# Patient Record
(demographics unavailable — no encounter records)

---

## 2024-10-31 NOTE — ASSESSMENT
[FreeTextEntry1] :  Patient is a 60F with right breast IDC (+/+/-).  She underwent bilateral scg mmg and subsequent right mmg/us in 10/2024 which showed right RA 5mm mass biopsied as IDC (+/+/-).  I had a lengthy discussion with this patient regarding diagnostic results, impressions, recommendations, risks and benefits. I have explained to the patient that the needle biopsy yielded a diagnosis of invasive breast cancer.  We reviewed the treatment of breast cancer, including surgery, radiation, chemotherapy, and anti-estrogen treatment.  Surgical options were reviewed, including a wide excision to negative margins with SLNB and subsequent whole breast radiation versus a mastectomy with or without reconstruction. She understood that one could still have a recurrence after a mastectomy. Patient understands that her overall survival is equivalent whether she undergoes a partial mastectomy with adjuvant radiotherapy or whether she undergoes a mastectomy, as evidenced by the NSABP B-06 trial. I have explained to her that while survival rates are the same between these two options, breast conservation therapy is associated with a slightly higher risk of local recurrence.  Axillary staging was discussed. We reviewed the sentinel lymph node procedure and associated risks of lymphedema, numbness, range of motion deficit and damage to surrounding structures.   The general indications for the use of adjuvant hormonal therapy, chemotherapy and targeted therapies were discussed. It was explained that medical treatments are dependent on the pathology results including the receptor status. We reviewed that her cancer is estrogen positive, and that may necessitate anti-estrogen therapy for 5-10 years. We discussed that a genomic assay, Oncotype Dx, might be sent on her surgical specimen and this will determine her need for adjuvant chemotherapy which is usually sequenced prior to adjuvant radiation therapy. She will be referred to medical oncology for an interpretation of her results and further treatment after the surgery.  The indications for radiation therapy and common side effects were discussed. The patient will meet with a radiation oncologist if indicated. Radiation is usually needed after breast conservation. In addition, even with a mastectomy, radiation might be needed under certain circumstances such as close margins and positive nodes. We discussed that radiotherapy is usually given Monday through Friday for 3-5 weeks, but could be longer or shorter.   The genetics of breast cancer were discussed.  Patient wishes to proceed with breast conservation with sentinel node biopsy. We reviewed that the risks of the operation include, but are not limited to damage to surrounding structures, infection, bleeding, cosmetic deformity, sensory changes, need for re-excision, and anesthetic related complications and an up to 8% risk of upper extremity lymphedema with a sentinel node biopsy as quoted by the NSABP trials. She understands that with lumpectomy, if margins are positive, additional surgery would be required.  Bio-impedance testing performed.  All questions and concerns were answered in detail.  Patient is for right breast needle localized lumpectomy and axillary sentinel lymph node biopsy. Sozo performed.  Total time spent on encounter was greater than 60 minutes , which included face to face time with the patient, performing an exam, reviewing previous medical records, reviewing current imaging/ pathology, documenting in patient record and coordinating care/imaging. Greater than 50% of the encounter was spent on counseling and coordination of her breast issue.

## 2024-10-31 NOTE — PHYSICAL EXAM
[Normocephalic] : normocephalic [EOMI] : extra ocular movement intact [No Supraclavicular Adenopathy] : no supraclavicular adenopathy [No Cervical Adenopathy] : no cervical adenopathy [Examined in the supine and seated position] : examined in the supine and seated position [No dominant masses] : no dominant masses in right breast  [No dominant masses] : no dominant masses left breast [No Nipple Retraction] : no left nipple retraction [No Nipple Discharge] : no left nipple discharge [No Axillary Lymphadenopathy] : no left axillary lymphadenopathy [No Rashes] : no rashes [No Ulceration] : no ulceration [de-identified] : NL respirations [de-identified] : Post biopsy ecchymosis noted

## 2024-10-31 NOTE — HISTORY OF PRESENT ILLNESS
[FreeTextEntry1] : ULZ PANDYA is a 60-year-old female patient who presents today for initial consultation for newly diagnosed right breast cancer.  FHx: Denies  Her work-up is as follows: B/L Screening Mammo - 09/06/2024: -Breast density: There are scattered areas of fibroglandular density. -No suspicious masses, calcifications, or other findings are seen in the left breast.  -In the right breast at 6:00 location there is a new mass for which additional imaging is recommended. BI-RADS 0: INCOMPLETE - NEED ADDITIONAL IMAGING EVALUATION  Right Uni Dx Mammo & Sono - 10/09/2024: -In the retroareolar right breast there is an irregular mass which persists on additional imaging.  US BREAST LIMITED RIGHT -In the retroareolar right corresponding to the mammographic abnormality there is a hypoechoic irregular mass measuring 0.5 x 0.4 x 0.5 cm.  Ultrasound core biopsy is recommended for further evaluation. BI-RADS 4: SUSPICIOUS  US Guided Core Bx - 10/24/2024: Right, retroareolar, 0.5cm: (cork) -Invasive ductal carcinoma, moderately differentiated. The pathology results are concordant with the imaging findings. ER  (+) [place]  (+) HER2  (-) Ki-67  15%  Denies any current complaints. No acute changes to her breasts.

## 2024-10-31 NOTE — DATA REVIEWED
[FreeTextEntry1] : B/L Screening Mammo - 09/06/2024: Tomosynthesis 3D and 2D imaging of the breast(s) were performed.  Current study was also evaluated with a computer aided detection (CAD) system.   Breast density: There are scattered areas of fibroglandular density.   No suspicious masses, calcifications, or other findings are seen in the left breast.  In the right breast at 6:00 location there is a new mass for which additional imaging is recommended. IMPRESSION:    Indeterminant mass in the right breast. Additional spot compression views, lateral view and ultrasound is recommended.   An additional imaging exam of the right breast(s) is recommended.   The patient will be sent a letter to return for additional imaging.   BI-RADS 0: INCOMPLETE - NEED ADDITIONAL IMAGING EVALUATION   Right Uni Dx Mammo & Sono - 10/09/2024: MAMMOGRAM:    Tomosynthesis 3D and 2D imaging of the breast(s) were performed.  Current study was also evaluated with a computer aided detection (CAD) system.   Breast Density: There are scattered areas of fibroglandular density.   In the retroareolar right breast there is an irregular mass which persists on additional imaging.    US BREAST LIMITED RIGHT   Color flow, Gray scale and real-time ultrasound of the right breast was performed and targeted to the area(s) of interest.   In the retroareolar right corresponding to the mammographic abnormality there is a hypoechoic irregular mass measuring 0.5 x 0.4 x 0.5 cm. Ultrasound core biopsy is recommended for further evaluation. OVERALL IMPRESSION:    Indeterminant hypoechoic mass in the retroareolar right breast corresponding to the mammographic abnormality for which ultrasound core biopsy is recommended   Biopsy of the right breast(s) is recommended.   A letter will be sent to the patient to return for a biopsy.   If management other than recommended is necessary, the referrer is advised to reach out to the biopsy coordinator at [phone] to ensure appropriate scheduling and documentation.   BI-RADS 4: SUSPICIOUS   US Guided Core Bx - 10/24/2024: Right, retroareolar, 0.5cm: (cork) -Invasive ductal carcinoma, moderately differentiated. The pathology results are concordant with the imaging findings. ER  (+) CA  (+) HER2  (-) Ki-67  15%

## 2024-12-15 NOTE — ASSESSMENT
[FreeTextEntry1] : 59 yo female has stage IA ([id number]) IDC of the right breast, G2, ER/UT positive and Her-2 negative, s/p right breast lumpectomy and SLNB with negative margins.   Assessment and Plan: We had a detailed discussion regarding to her diagnosis, stage, prognosis and adjuvant systemic therapy. The pathology report was reviewed. Janet has stage IA IDC of the right breast, ER/UT positive and Her-2 negative with favorable pathologic features (small size, G2, negative SLN, -LVI). Her risk of distant recurrence would be low. Oncotype RS is pending. She is recommended for adjuvant endocrine therapy.  We reviewed benefit and side effects of adjuvant endocrine therapy with AI. Anastrozole 1 mg daily for 5 years is recommended. The potential side effects may include but not limit to muscular and skeletal symptoms, arthralgia, bone loss, osteopenia and osteoporosis, a small increased risk of cardiovascular events and slight increase of hyperlipidemia.  We discussed bone health management. She is recommended to take calcium and vitamin d supplement, and regular physical activities. She will be referred for bone density. We will make additional recommendation based on her bone density.  She will see Dr. Min to discuss adjuvant breast radiotherapy. She will start Anastrozole after completion of radiotherapy. A script was sent to her pharmacy.   All questions were answered appropriately. She agrees with the plan.  RTO for followup in 3 months. She will call if there is nay concern.

## 2024-12-15 NOTE — CONSULT LETTER
[Dear  ___] : Dear  [unfilled], [Consult Letter:] : I had the pleasure of evaluating your patient, [unfilled]. [Please see my note below.] : Please see my note below. [Consult Closing:] : Thank you very much for allowing me to participate in the care of this patient.  If you have any questions, please do not hesitate to contact me. [Sincerely,] : Sincerely, [DrIngrid  ___] : Dr. WILSON [DrIngrid ___] : Dr. WILSON [FreeTextEntry3] : Megan Casas MD

## 2024-12-15 NOTE — HISTORY OF PRESENT ILLNESS
[de-identified] : 60-year-old female patient is referred by Dr Cruz for consultation of adjuvant systemic therapy for right breast cancer.  Her work-up is as follows: B/L Screening Mammo - 09/06/2024: -Breast density: There are scattered areas of fibroglandular density. -No suspicious masses, calcifications, or other findings are seen in the left breast. -In the right breast at 6:00 location there is a new mass for which additional imaging is recommended. BI-RADS 0: INCOMPLETE - NEED ADDITIONAL IMAGING EVALUATION  Right Uni Dx Mammo & Sono - 10/09/2024: -In the retroareolar right breast there is an irregular mass which persists on additional imaging. US BREAST LIMITED RIGHT -In the retroareolar right corresponding to the mammographic abnormality there is a hypoechoic irregular mass measuring 0.5 x 0.4 x 0.5 cm. Ultrasound core biopsy is recommended for further evaluation. BI-RADS 4: SUSPICIOUS  US Guided Core Bx - 10/24/2024: Right, retroareolar, 0.5cm: (cork) -Invasive ductal carcinoma, moderately differentiated, ER pos %, [place] pos 51-60%, Her-2 neg (1+ by IHC), Ki-67 15%.  On 11/27/2024Breast, she had right retroareolar mass, needle localized lumpectomy: - Prior biopsy site changes with invasive moderately differentiated ductal carcinoma, 9.5 mm (microscopic measurement), with scattered single cell necrosis and focal solid papillary features. - Non-extensive ductal carcinoma in situ (DCIS), cribriform and comedo types, intermediate nuclear grade. - Margins are negative.  - No lymphovascular invasion is identified. - Atrophic fatty breast tissue with proliferative type fibrocystic changes associated with phosphate microcalcifications present in small ductules. - Two lymph nodes (0/2). Negative for carcinoma. - AJCC 8th Edition Pathologic Stage: pT1b, p(sn)N0, pMx.  She recovered well from surgery and is here today to discuss adjuvant systemic therapy.

## 2024-12-15 NOTE — PHYSICAL EXAM
[Fully active, able to carry on all pre-disease performance without restriction] : Status 0 - Fully active, able to carry on all pre-disease performance without restriction [Normal] : grossly intact [de-identified] : S/P right breast lumpectomy and right axillary SLNB. Surgical incisions are healing well.

## 2024-12-15 NOTE — PHYSICAL EXAM
[Fully active, able to carry on all pre-disease performance without restriction] : Status 0 - Fully active, able to carry on all pre-disease performance without restriction [Normal] : grossly intact [de-identified] : S/P right breast lumpectomy and right axillary SLNB. Surgical incisions are healing well.

## 2024-12-15 NOTE — ASSESSMENT
[FreeTextEntry1] : 61 yo female has stage IA ([id number]) IDC of the right breast, G2, ER/KS positive and Her-2 negative, s/p right breast lumpectomy and SLNB with negative margins.   Assessment and Plan: We had a detailed discussion regarding to her diagnosis, stage, prognosis and adjuvant systemic therapy. The pathology report was reviewed. Janet has stage IA IDC of the right breast, ER/KS positive and Her-2 negative with favorable pathologic features (small size, G2, negative SLN, -LVI). Her risk of distant recurrence would be low. Oncotype RS is pending. She is recommended for adjuvant endocrine therapy.  We reviewed benefit and side effects of adjuvant endocrine therapy with AI. Anastrozole 1 mg daily for 5 years is recommended. The potential side effects may include but not limit to muscular and skeletal symptoms, arthralgia, bone loss, osteopenia and osteoporosis, a small increased risk of cardiovascular events and slight increase of hyperlipidemia.  We discussed bone health management. She is recommended to take calcium and vitamin d supplement, and regular physical activities. She will be referred for bone density. We will make additional recommendation based on her bone density.  She will see Dr. Min to discuss adjuvant breast radiotherapy. She will start Anastrozole after completion of radiotherapy. A script was sent to her pharmacy.   All questions were answered appropriately. She agrees with the plan.  RTO for followup in 3 months. She will call if there is nay concern.

## 2024-12-15 NOTE — HISTORY OF PRESENT ILLNESS
[de-identified] : 60-year-old female patient is referred by Dr Cruz for consultation of adjuvant systemic therapy for right breast cancer.  Her work-up is as follows: B/L Screening Mammo - 09/06/2024: -Breast density: There are scattered areas of fibroglandular density. -No suspicious masses, calcifications, or other findings are seen in the left breast. -In the right breast at 6:00 location there is a new mass for which additional imaging is recommended. BI-RADS 0: INCOMPLETE - NEED ADDITIONAL IMAGING EVALUATION  Right Uni Dx Mammo & Sono - 10/09/2024: -In the retroareolar right breast there is an irregular mass which persists on additional imaging. US BREAST LIMITED RIGHT -In the retroareolar right corresponding to the mammographic abnormality there is a hypoechoic irregular mass measuring 0.5 x 0.4 x 0.5 cm. Ultrasound core biopsy is recommended for further evaluation. BI-RADS 4: SUSPICIOUS  US Guided Core Bx - 10/24/2024: Right, retroareolar, 0.5cm: (cork) -Invasive ductal carcinoma, moderately differentiated, ER pos %, [place] pos 51-60%, Her-2 neg (1+ by IHC), Ki-67 15%.  On 11/27/2024Breast, she had right retroareolar mass, needle localized lumpectomy: - Prior biopsy site changes with invasive moderately differentiated ductal carcinoma, 9.5 mm (microscopic measurement), with scattered single cell necrosis and focal solid papillary features. - Non-extensive ductal carcinoma in situ (DCIS), cribriform and comedo types, intermediate nuclear grade. - Margins are negative.  - No lymphovascular invasion is identified. - Atrophic fatty breast tissue with proliferative type fibrocystic changes associated with phosphate microcalcifications present in small ductules. - Two lymph nodes (0/2). Negative for carcinoma. - AJCC 8th Edition Pathologic Stage: pT1b, p(sn)N0, pMx.  She recovered well from surgery and is here today to discuss adjuvant systemic therapy.

## 2024-12-15 NOTE — HISTORY OF PRESENT ILLNESS
[de-identified] : 60-year-old female patient is referred by Dr Cruz for consultation of adjuvant systemic therapy for right breast cancer.  Her work-up is as follows: B/L Screening Mammo - 09/06/2024: -Breast density: There are scattered areas of fibroglandular density. -No suspicious masses, calcifications, or other findings are seen in the left breast. -In the right breast at 6:00 location there is a new mass for which additional imaging is recommended. BI-RADS 0: INCOMPLETE - NEED ADDITIONAL IMAGING EVALUATION  Right Uni Dx Mammo & Sono - 10/09/2024: -In the retroareolar right breast there is an irregular mass which persists on additional imaging. US BREAST LIMITED RIGHT -In the retroareolar right corresponding to the mammographic abnormality there is a hypoechoic irregular mass measuring 0.5 x 0.4 x 0.5 cm. Ultrasound core biopsy is recommended for further evaluation. BI-RADS 4: SUSPICIOUS  US Guided Core Bx - 10/24/2024: Right, retroareolar, 0.5cm: (cork) -Invasive ductal carcinoma, moderately differentiated, ER pos %, [place] pos 51-60%, Her-2 neg (1+ by IHC), Ki-67 15%.  On 11/27/2024Breast, she had right retroareolar mass, needle localized lumpectomy: - Prior biopsy site changes with invasive moderately differentiated ductal carcinoma, 9.5 mm (microscopic measurement), with scattered single cell necrosis and focal solid papillary features. - Non-extensive ductal carcinoma in situ (DCIS), cribriform and comedo types, intermediate nuclear grade. - Margins are negative.  - No lymphovascular invasion is identified. - Atrophic fatty breast tissue with proliferative type fibrocystic changes associated with phosphate microcalcifications present in small ductules. - Two lymph nodes (0/2). Negative for carcinoma. - AJCC 8th Edition Pathologic Stage: pT1b, p(sn)N0, pMx.  She recovered well from surgery and is here today to discuss adjuvant systemic therapy.

## 2024-12-15 NOTE — ASSESSMENT
[FreeTextEntry1] : 59 yo female has stage IA ([id number]) IDC of the right breast, G2, ER/KS positive and Her-2 negative, s/p right breast lumpectomy and SLNB with negative margins.   Assessment and Plan: We had a detailed discussion regarding to her diagnosis, stage, prognosis and adjuvant systemic therapy. The pathology report was reviewed. Janet has stage IA IDC of the right breast, ER/KS positive and Her-2 negative with favorable pathologic features (small size, G2, negative SLN, -LVI). Her risk of distant recurrence would be low. Oncotype RS is pending. She is recommended for adjuvant endocrine therapy.  We reviewed benefit and side effects of adjuvant endocrine therapy with AI. Anastrozole 1 mg daily for 5 years is recommended. The potential side effects may include but not limit to muscular and skeletal symptoms, arthralgia, bone loss, osteopenia and osteoporosis, a small increased risk of cardiovascular events and slight increase of hyperlipidemia.  We discussed bone health management. She is recommended to take calcium and vitamin d supplement, and regular physical activities. She will be referred for bone density. We will make additional recommendation based on her bone density.  She will see Dr. Min to discuss adjuvant breast radiotherapy. She will start Anastrozole after completion of radiotherapy. A script was sent to her pharmacy.   All questions were answered appropriately. She agrees with the plan.  RTO for followup in 3 months. She will call if there is nay concern.

## 2024-12-15 NOTE — HISTORY OF PRESENT ILLNESS
[de-identified] : 60-year-old female patient is referred by Dr Cruz for consultation of adjuvant systemic therapy for right breast cancer.  Her work-up is as follows: B/L Screening Mammo - 09/06/2024: -Breast density: There are scattered areas of fibroglandular density. -No suspicious masses, calcifications, or other findings are seen in the left breast. -In the right breast at 6:00 location there is a new mass for which additional imaging is recommended. BI-RADS 0: INCOMPLETE - NEED ADDITIONAL IMAGING EVALUATION  Right Uni Dx Mammo & Sono - 10/09/2024: -In the retroareolar right breast there is an irregular mass which persists on additional imaging. US BREAST LIMITED RIGHT -In the retroareolar right corresponding to the mammographic abnormality there is a hypoechoic irregular mass measuring 0.5 x 0.4 x 0.5 cm. Ultrasound core biopsy is recommended for further evaluation. BI-RADS 4: SUSPICIOUS  US Guided Core Bx - 10/24/2024: Right, retroareolar, 0.5cm: (cork) -Invasive ductal carcinoma, moderately differentiated, ER pos %, [place] pos 51-60%, Her-2 neg (1+ by IHC), Ki-67 15%.  On 11/27/2024Breast, she had right retroareolar mass, needle localized lumpectomy: - Prior biopsy site changes with invasive moderately differentiated ductal carcinoma, 9.5 mm (microscopic measurement), with scattered single cell necrosis and focal solid papillary features. - Non-extensive ductal carcinoma in situ (DCIS), cribriform and comedo types, intermediate nuclear grade. - Margins are negative.  - No lymphovascular invasion is identified. - Atrophic fatty breast tissue with proliferative type fibrocystic changes associated with phosphate microcalcifications present in small ductules. - Two lymph nodes (0/2). Negative for carcinoma. - AJCC 8th Edition Pathologic Stage: pT1b, p(sn)N0, pMx.  She recovered well from surgery and is here today to discuss adjuvant systemic therapy.

## 2024-12-15 NOTE — PHYSICAL EXAM
[Fully active, able to carry on all pre-disease performance without restriction] : Status 0 - Fully active, able to carry on all pre-disease performance without restriction [Normal] : grossly intact [de-identified] : S/P right breast lumpectomy and right axillary SLNB. Surgical incisions are healing well.

## 2024-12-15 NOTE — PHYSICAL EXAM
[Fully active, able to carry on all pre-disease performance without restriction] : Status 0 - Fully active, able to carry on all pre-disease performance without restriction [Normal] : grossly intact [de-identified] : S/P right breast lumpectomy and right axillary SLNB. Surgical incisions are healing well.

## 2024-12-15 NOTE — ASSESSMENT
[FreeTextEntry1] : 61 yo female has stage IA ([id number]) IDC of the right breast, G2, ER/SC positive and Her-2 negative, s/p right breast lumpectomy and SLNB with negative margins.   Assessment and Plan: We had a detailed discussion regarding to her diagnosis, stage, prognosis and adjuvant systemic therapy. The pathology report was reviewed. Janet has stage IA IDC of the right breast, ER/SC positive and Her-2 negative with favorable pathologic features (small size, G2, negative SLN, -LVI). Her risk of distant recurrence would be low. Oncotype RS is pending. She is recommended for adjuvant endocrine therapy.  We reviewed benefit and side effects of adjuvant endocrine therapy with AI. Anastrozole 1 mg daily for 5 years is recommended. The potential side effects may include but not limit to muscular and skeletal symptoms, arthralgia, bone loss, osteopenia and osteoporosis, a small increased risk of cardiovascular events and slight increase of hyperlipidemia.  We discussed bone health management. She is recommended to take calcium and vitamin d supplement, and regular physical activities. She will be referred for bone density. We will make additional recommendation based on her bone density.  She will see Dr. Min to discuss adjuvant breast radiotherapy. She will start Anastrozole after completion of radiotherapy. A script was sent to her pharmacy.   All questions were answered appropriately. She agrees with the plan.  RTO for followup in 3 months. She will call if there is nay concern.

## 2024-12-16 NOTE — DATA REVIEWED
[FreeTextEntry1] :  11/27/2024 15:43 EST  Surgical Pathology Report - Auth (Verified)  Specimen(s) Submitted 1  Right breast mass Time obtained: 2:20 pm Cold ischemic time <1 hour 2  Right breast medial margin Time obtained: 2:22 pm Cold ischemic time <1 hour 3  Right breast lateral margin Time obtained: 2:24 pm Cold ischemic time <1 hour 4  Right breast superior margin Time obtained: 2:25 pm Cold ischemic time <1 hour 5  Right breast inferior margin Time obtained: 2:26 pm Cold ischemic time <1 hour 6  Right breast posterior margin Time obtained: 2:27 pm Cold ischemic time <1 hour 7  Right breast anterior margin Time obtained: 2:29 pm Cold ischemic time <1 hour 8  Right axilla sentinel node #1  Time obtained: 2:34 pm Cold ischemic time <1 hour  Final Diagnosis 1.  Breast, right retroareolar mass, needle localized lumpectomy: - Prior biopsy site changes with invasive moderately differentiated ductal carcinoma, 9.5 mm (microscopic measurement), with scattered single cell necrosis and focal solid papillary features. - Non-extensive ductal carcinoma in situ (DCIS), cribriform and comedo types, intermediate nuclear grade. - For final surgical margin status please see specimen parts #2-#7 below. - No lymphovascular invasion is identified. - Atrophic fatty breast tissue with proliferative type fibrocystic changes associated with phosphate microcalcifications present in small ductules. - For hormone receptor and oncoprotein expression status please see the pathology report from the prior needle core biopsy specimen ([id number]). - AJCC 8th Edition Pathologic Stage: pT1b, p(sn)N0, pMx.  2.  Breast, right medial margin, excision: - Focus of invasive ductal carcinoma located 4.0 mm from the nearest new inked margin (microscopic measurement).  3.  Breast, right lateral margin, excision: - Benign atrophic fatty breast tissue without histopathologic abnormality.  Negative for carcinoma.  4.  Breast, right superior margin, excision: - Benign atrophic fatty breast tissue without histopathologic abnormality.  Negative for carcinoma.  5.  Breast, right inferior margin, excision: - Benign atrophic fatty breast tissue without histopathologic abnormality.  Negative for carcinoma.  6.  Breast, right posterior margin, excision: - Benign atrophic breast tissue with proliferative type fibrocystic changes.  Negative for carcinoma.  7.  Breast, right anterior margin, excision:  - Benign atrophic fatty breast tissue without histopathologic abnormality.  Negative for carcinoma.  8.  Breast, right axillary sentinel lymph node #1, excision: - Two lymph nodes (0/2).  Negative for carcinoma with evaluation of multiple H\T\E levels and with negative immunohistochemical stains for cytokeratins (CK AE1/AE3 and CK 8/18).  Note:  All controls show appropriate reactivity.

## 2024-12-16 NOTE — VITALS
[Date: ____________] : Patient's last distress assessment performed on [unfilled]. [0 - No Distress] : Distress Level: 0 [Referred Patient  to social work for follow-up] : Patient was referred to social work for follow-up [Patient given social work contact information and resource sheet] : Patient was given social work contact information and resource sheet [Maximal Pain Intensity: 0/10] : 0/10 [90: Able to carry normal activity; minor signs or symptoms of disease.] : 90: Able to carry normal activity; minor signs or symptoms of disease.

## 2024-12-16 NOTE — PHYSICAL EXAM
[Normocephalic] : normocephalic [EOMI] : extra ocular movement intact [No Rashes] : no rashes [No Ulceration] : no ulceration [de-identified] : NL respirations [de-identified] : Incision site healing well with no signs of infection/dehiscence

## 2024-12-16 NOTE — PHYSICAL EXAM
[Normocephalic] : normocephalic [EOMI] : extra ocular movement intact [No Rashes] : no rashes [No Ulceration] : no ulceration [de-identified] : NL respirations [de-identified] : Incision site healing well with no signs of infection/dehiscence

## 2024-12-16 NOTE — DATA REVIEWED
[FreeTextEntry1] :  11/27/2024 15:43 EST  Surgical Pathology Report - Auth (Verified)  Specimen(s) Submitted 1  Right breast mass Time obtained: 2:20 pm Cold ischemic time <1 hour 2  Right breast medial margin Time obtained: 2:22 pm Cold ischemic time <1 hour 3  Right breast lateral margin Time obtained: 2:24 pm Cold ischemic time <1 hour 4  Right breast superior margin Time obtained: 2:25 pm Cold ischemic time <1 hour 5  Right breast inferior margin Time obtained: 2:26 pm Cold ischemic time <1 hour 6  Right breast posterior margin Time obtained: 2:27 pm Cold ischemic time <1 hour 7  Right breast anterior margin Time obtained: 2:29 pm Cold ischemic time <1 hour 8  Right axilla sentinel node #1  Time obtained: 2:34 pm Cold ischemic time <1 hour  Final Diagnosis 1.  Breast, right retroareolar mass, needle localized lumpectomy: - Prior biopsy site changes with invasive moderately differentiated ductal carcinoma, 9.5 mm (microscopic measurement), with scattered single cell necrosis and focal solid papillary features. - Non-extensive ductal carcinoma in situ (DCIS), cribriform and comedo types, intermediate nuclear grade. - For final surgical margin status please see specimen parts #2-#7 below. - No lymphovascular invasion is identified. - Atrophic fatty breast tissue with proliferative type fibrocystic changes associated with phosphate microcalcifications present in small ductules. - For hormone receptor and oncoprotein expression status please see the pathology report from the prior needle core biopsy specimen ([id number]). - AJCC 8th Edition Pathologic Stage: pT1b, p(sn)N0, pMx.  2.  Breast, right medial margin, excision: - Focus of invasive ductal carcinoma located 4.0 mm from the nearest new inked margin (microscopic measurement).  3.  Breast, right lateral margin, excision: - Benign atrophic fatty breast tissue without histopathologic abnormality.  Negative for carcinoma.  4.  Breast, right superior margin, excision: - Benign atrophic fatty breast tissue without histopathologic abnormality.  Negative for carcinoma.  5.  Breast, right inferior margin, excision: - Benign atrophic fatty breast tissue without histopathologic abnormality.  Negative for carcinoma.  6.  Breast, right posterior margin, excision: - Benign atrophic breast tissue with proliferative type fibrocystic changes.  Negative for carcinoma.  7.  Breast, right anterior margin, excision:  - Benign atrophic fatty breast tissue without histopathologic abnormality.  Negative for carcinoma.  8.  Breast, right axillary sentinel lymph node #1, excision: - Two lymph nodes (0/2).  Negative for carcinoma with evaluation of multiple H\T\E levels and with negative immunohistochemical stains for cytokeratins (CK AE1/AE3 and CK 8/18).  Note:  All controls show appropriate reactivity.

## 2024-12-16 NOTE — HISTORY OF PRESENT ILLNESS
[FreeTextEntry1] : LUZ PANDYA is a 60-year-old female patient with right breast cancer s/p lumpectomy and SLNB on 11/27/24: 9.5mm IDC. 0/2 nodes.pT1b, p(sn)N0, pMx.  FHx: Denies  Her work-up is as follows: B/L Screening Mammo - 09/06/2024: -Breast density: There are scattered areas of fibroglandular density. -No suspicious masses, calcifications, or other findings are seen in the left breast.  -In the right breast at 6:00 location there is a new mass for which additional imaging is recommended. BI-RADS 0: INCOMPLETE - NEED ADDITIONAL IMAGING EVALUATION  Right Uni Dx Mammo & Sono - 10/09/2024: -In the retroareolar right breast there is an irregular mass which persists on additional imaging.  US BREAST LIMITED RIGHT -In the retroareolar right corresponding to the mammographic abnormality there is a hypoechoic irregular mass measuring 0.5 x 0.4 x 0.5 cm.  Ultrasound core biopsy is recommended for further evaluation. BI-RADS 4: SUSPICIOUS  US Guided Core Bx - 10/24/2024: Right, retroareolar, 0.5cm: (cork) -Invasive ductal carcinoma, moderately differentiated. The pathology results are concordant with the imaging findings. ER  (+) ND  (+) HER2  (-) Ki-67  15%  11/27/2024Breast, right retroareolar mass, needle localized lumpectomy: - Prior biopsy site changes with invasive moderately differentiated ductal carcinoma, 9.5 mm (microscopic measurement), with scattered single cell necrosis and focal solid papillary features. - Non-extensive ductal carcinoma in situ (DCIS), cribriform and comedo types, intermediate nuclear grade. - No lymphovascular invasion is identified. - Atrophic fatty breast tissue with proliferative type fibrocystic changes associated with phosphate microcalcifications present in small ductules. Breast, right axillary sentinel lymph node #1, excision: - Two lymph nodes (0/2).  Negative for carcinoma with evaluation of multiple H T E levels and with negative immunohistochemical stains for cytokeratins (CK AE1/AE3 and CK 8/18). pT1b, p(sn)N0, pMx.  Denies any acute complaints. Healing well.

## 2024-12-16 NOTE — ASSESSMENT
[FreeTextEntry1] : LUZ PANDYA is a 60-year-old female patient with right breast cancer s/p lumpectomy and SLNB on 11/27/24: 9.5mm IDC. 0/2 nodes.pT1b, p(sn)N0, pMx.  She underwent bilateral scg mmg and subsequent right mmg/us in 10/2024 which showed right RA 5mm mass biopsied as IDC (+/+/-).  She is s/p lumpectomy and SLNB on 11/27/24: 9.5mm IDC. 0/2 nodes.pT1b, p(sn)N0, pMx.  We discussed her pathology in detail. she will be referred to medical and radiation oncology. Oncotype pending.  All questions and concerns were answered in detail.  Oncotype pending. Patient is for right mmg/us in 5/2025. Referral to medical oncology. Referral to radiation oncology. She is for follow up after imaging, pending any interval changes.  Total time spent on encounter was greater than 20 minutes , which included face to face time with the patient, performing an exam, reviewing previous medical records, reviewing current imaging/ pathology, documenting in patient record and coordinating care/imaging. Greater than 50% of the encounter was spent on counseling and coordination of her breast issue.

## 2024-12-16 NOTE — HISTORY OF PRESENT ILLNESS
[FreeTextEntry1] : LUZ PANDYA is a 60-year-old female patient with right breast cancer s/p lumpectomy and SLNB on 11/27/24: 9.5mm IDC. 0/2 nodes.pT1b, p(sn)N0, pMx.  FHx: Denies  Her work-up is as follows: B/L Screening Mammo - 09/06/2024: -Breast density: There are scattered areas of fibroglandular density. -No suspicious masses, calcifications, or other findings are seen in the left breast.  -In the right breast at 6:00 location there is a new mass for which additional imaging is recommended. BI-RADS 0: INCOMPLETE - NEED ADDITIONAL IMAGING EVALUATION  Right Uni Dx Mammo & Sono - 10/09/2024: -In the retroareolar right breast there is an irregular mass which persists on additional imaging.  US BREAST LIMITED RIGHT -In the retroareolar right corresponding to the mammographic abnormality there is a hypoechoic irregular mass measuring 0.5 x 0.4 x 0.5 cm.  Ultrasound core biopsy is recommended for further evaluation. BI-RADS 4: SUSPICIOUS  US Guided Core Bx - 10/24/2024: Right, retroareolar, 0.5cm: (cork) -Invasive ductal carcinoma, moderately differentiated. The pathology results are concordant with the imaging findings. ER  (+) LA  (+) HER2  (-) Ki-67  15%  11/27/2024Breast, right retroareolar mass, needle localized lumpectomy: - Prior biopsy site changes with invasive moderately differentiated ductal carcinoma, 9.5 mm (microscopic measurement), with scattered single cell necrosis and focal solid papillary features. - Non-extensive ductal carcinoma in situ (DCIS), cribriform and comedo types, intermediate nuclear grade. - No lymphovascular invasion is identified. - Atrophic fatty breast tissue with proliferative type fibrocystic changes associated with phosphate microcalcifications present in small ductules. Breast, right axillary sentinel lymph node #1, excision: - Two lymph nodes (0/2).  Negative for carcinoma with evaluation of multiple H T E levels and with negative immunohistochemical stains for cytokeratins (CK AE1/AE3 and CK 8/18). pT1b, p(sn)N0, pMx.  Denies any acute complaints. Healing well.

## 2024-12-24 NOTE — LETTER CLOSING
[Consult Closing:] : Thank you for allowing me to participate in the care of this patient.  If you have any questions, please do not hesitate to contact me. [Sincerely yours,] : Sincerely yours, [FreeTextEntry3] : Juliette Min M.D.   Electronically proofread and signed by:  Juliette Min MD Attending, Department of Radiation Medicine [facility]

## 2024-12-24 NOTE — DISEASE MANAGEMENT
[Pathological] : TNM Stage: p [IA] : IA [FreeTextEntry4] : invasive ductal carcinoma of the right breast, G2, ER+, [place]+, HER2 -, central region [TTNM] : 1b [NTNM] : 0 [MTNM] : 0 [de-identified] : right breast

## 2024-12-24 NOTE — LETTER CLOSING
[Consult Closing:] : Thank you for allowing me to participate in the care of this patient.  If you have any questions, please do not hesitate to contact me. [Sincerely yours,] : Sincerely yours, [FreeTextEntry3] : Juliette Min M.D.   Electronically proofread and signed by:  Juliette Min MD Attending, Department of Radiation Medicine [facility]

## 2024-12-24 NOTE — DISEASE MANAGEMENT
[Pathological] : TNM Stage: p [IA] : IA [FreeTextEntry4] : invasive ductal carcinoma of the right breast, G2, ER+, [place]+, HER2 -, central region [TTNM] : 1b [NTNM] : 0 [de-identified] : right breast  [MTNM] : 0

## 2024-12-24 NOTE — HISTORY OF PRESENT ILLNESS
[FreeTextEntry1] : The patient is a 60-year-old woman who was noted on screening mammogram (9/6/2024 @ [facility]) to have a mass in the right breast at 6 o'clock.  Right Diagnostic mammogram and ultrasound on 10/9/2024 showed in the right breast, an indeterminant hypoechoic mass, 0.5 X 0.4 X 0.5 cm in the retroareolar.  Biopsy of the right breast recommended.  BI-RADS 4: Suspicious   On 10/24/2024 ([facility]), she had a biopsy of the right breast abnormality at the retroareolar region and pathology showed invasive ductal carcinoma, G2.   On 11/11/2024, outside slides were reviewed at [facility] from [facility] and pathology showed right breast retroareolar mass, invasive moderately differentiated ductal carcinoma, with DCIS, ER+, [place]+, HER2 negative.  The Ki-67 15%.  On 11/27/2024, She underwent a right lumpectomy and sentinel node biopsy with Dr. Cruz.  She was found to have a 9.5 mm invasive ductal carcinoma, G2 with DCIS, negative for EIC, ER/NC positive / HER2 negative.  Surgical margins were negative as well as 0/2 negative sentinel lymph node.  There was no LVSI/PNI.    She established care with Dr. Casas, endocrine therapy with Anastrozole recommended.  Her Oncotype is still pending.    She has been doing well since surgery.  She is here to discuss radiation.

## 2024-12-24 NOTE — PHYSICAL EXAM
[Sclera] : the sclera and conjunctiva were normal [Hearing Threshold Finger Rub Not [place]] : hearing was normal [Heart Rate And Rhythm] : heart rate and rhythm were normal [Heart Sounds] : normal S1 and S2 [Murmurs] : no murmurs present [Edema] : no peripheral edema present [No Discharge] : no discharge [No Masses] : no breast masses were palpable [Abdomen Soft] : soft [Nondistended] : nondistended [Abdomen Tenderness] : non-tender [Cervical Lymph Nodes Enlarged Posterior Bilaterally] : posterior cervical [Cervical Lymph Nodes Enlarged Anterior Bilaterally] : anterior cervical [Supraclavicular Lymph Nodes Enlarged Bilaterally] : supraclavicular [Nail Clubbing] : no clubbing  or cyanosis of the fingernails [Skin Color & Pigmentation] : normal skin color and pigmentation [No Focal Deficits] : no focal deficits [Motor Exam] : the motor exam was normal [Normal] : no palpable adenopathy [Enlargement Of The Right Breast] : no swelling [Tenderness Of The Right Breast] : no tenderness [___] :  no erythema [Enlargement Of The Left Breast] : no swelling [Tenderness Of The Left Breast] : no tenderness [Axillary Lymph Nodes Enlarged Bilaterally] : no enlarged nodes

## 2024-12-24 NOTE — LETTER CLOSING
[Consult Closing:] : Thank you for allowing me to participate in the care of this patient.  If you have any questions, please do not hesitate to contact me. [Sincerely yours,] : Sincerely yours, [FreeTextEntry3] : Juliette Min M.D.   Electronically proofread and signed by:  Juliette Min MD Attending, Department of Radiation Medicine [facility]

## 2024-12-24 NOTE — PHYSICAL EXAM
[Sclera] : the sclera and conjunctiva were normal [Hearing Threshold Finger Rub Not [place]] : hearing was normal [Heart Rate And Rhythm] : heart rate and rhythm were normal [Heart Sounds] : normal S1 and S2 [Murmurs] : no murmurs present [Edema] : no peripheral edema present [No Discharge] : no discharge [No Masses] : no breast masses were palpable [Abdomen Soft] : soft [Nondistended] : nondistended [Abdomen Tenderness] : non-tender [Cervical Lymph Nodes Enlarged Posterior Bilaterally] : posterior cervical [Cervical Lymph Nodes Enlarged Anterior Bilaterally] : anterior cervical [Supraclavicular Lymph Nodes Enlarged Bilaterally] : supraclavicular [Nail Clubbing] : no clubbing  or cyanosis of the fingernails [Skin Color & Pigmentation] : normal skin color and pigmentation [No Focal Deficits] : no focal deficits [Motor Exam] : the motor exam was normal [Normal] : no palpable adenopathy [Enlargement Of The Right Breast] : no swelling [Tenderness Of The Right Breast] : no tenderness [___] :  no erythema [Enlargement Of The Left Breast] : no swelling [Tenderness Of The Left Breast] : no tenderness [Axillary Lymph Nodes Enlarged Bilaterally] : no enlarged nodes

## 2024-12-24 NOTE — DISEASE MANAGEMENT
[Pathological] : TNM Stage: p [IA] : IA [FreeTextEntry4] : invasive ductal carcinoma of the right breast, G2, ER+, [place]+, HER2 -, central region [TTNM] : 1b [NTNM] : 0 [MTNM] : 0 [de-identified] : right breast

## 2024-12-24 NOTE — HISTORY OF PRESENT ILLNESS
[FreeTextEntry1] : The patient is a 60-year-old woman who was noted on screening mammogram (9/6/2024 @ [facility]) to have a mass in the right breast at 6 o'clock.  Right Diagnostic mammogram and ultrasound on 10/9/2024 showed in the right breast, an indeterminant hypoechoic mass, 0.5 X 0.4 X 0.5 cm in the retroareolar.  Biopsy of the right breast recommended.  BI-RADS 4: Suspicious   On 10/24/2024 ([facility]), she had a biopsy of the right breast abnormality at the retroareolar region and pathology showed invasive ductal carcinoma, G2.   On 11/11/2024, outside slides were reviewed at [facility] from [facility] and pathology showed right breast retroareolar mass, invasive moderately differentiated ductal carcinoma, with DCIS, ER+, [place]+, HER2 negative.  The Ki-67 15%.  On 11/27/2024, She underwent a right lumpectomy and sentinel node biopsy with Dr. Cruz.  She was found to have a 9.5 mm invasive ductal carcinoma, G2 with DCIS, negative for EIC, ER/[place] positive / HER2 negative.  Surgical margins were negative as well as 0/2 negative sentinel lymph node.  There was no LVSI/PNI.    She established care with Dr. Casas, endocrine therapy with Anastrozole recommended.  Her Oncotype is still pending.    She has been doing well since surgery.  She is here to discuss radiation.

## 2024-12-24 NOTE — PHYSICAL EXAM
[Sclera] : the sclera and conjunctiva were normal [Hearing Threshold Finger Rub Not [place]] : hearing was normal [Heart Rate And Rhythm] : heart rate and rhythm were normal [Heart Sounds] : normal S1 and S2 [Murmurs] : no murmurs present [Edema] : no peripheral edema present [No Discharge] : no discharge [No Masses] : no breast masses were palpable [Abdomen Soft] : soft [Nondistended] : nondistended [Abdomen Tenderness] : non-tender [Cervical Lymph Nodes Enlarged Posterior Bilaterally] : posterior cervical [Cervical Lymph Nodes Enlarged Anterior Bilaterally] : anterior cervical [Supraclavicular Lymph Nodes Enlarged Bilaterally] : supraclavicular [Nail Clubbing] : no clubbing  or cyanosis of the fingernails [Skin Color & Pigmentation] : normal skin color and pigmentation [No Focal Deficits] : no focal deficits [Motor Exam] : the motor exam was normal [Normal] : no palpable adenopathy [Enlargement Of The Right Breast] : no swelling [Tenderness Of The Right Breast] : no tenderness [___] :  no erythema [Enlargement Of The Left Breast] : no swelling [Tenderness Of The Left Breast] : no tenderness [Axillary Lymph Nodes Enlarged Bilaterally] : no enlarged nodes

## 2024-12-24 NOTE — HISTORY OF PRESENT ILLNESS
[FreeTextEntry1] : The patient is a 60-year-old woman who was noted on screening mammogram (9/6/2024 @ [facility]) to have a mass in the right breast at 6 o'clock.  Right Diagnostic mammogram and ultrasound on 10/9/2024 showed in the right breast, an indeterminant hypoechoic mass, 0.5 X 0.4 X 0.5 cm in the retroareolar.  Biopsy of the right breast recommended.  BI-RADS 4: Suspicious   On 10/24/2024 ([facility]), she had a biopsy of the right breast abnormality at the retroareolar region and pathology showed invasive ductal carcinoma, G2.   On 11/11/2024, outside slides were reviewed at [facility] from [facility] and pathology showed right breast retroareolar mass, invasive moderately differentiated ductal carcinoma, with DCIS, ER+, [place]+, HER2 negative.  The Ki-67 15%.  On 11/27/2024, She underwent a right lumpectomy and sentinel node biopsy with Dr. Cruz.  She was found to have a 9.5 mm invasive ductal carcinoma, G2 with DCIS, negative for EIC, ER/PA positive / HER2 negative.  Surgical margins were negative as well as 0/2 negative sentinel lymph node.  There was no LVSI/PNI.    She established care with Dr. Casas, endocrine therapy with Anastrozole recommended.  Her Oncotype is still pending.    She has been doing well since surgery.  She is here to discuss radiation.

## 2024-12-24 NOTE — DISEASE MANAGEMENT
[Pathological] : TNM Stage: p [IA] : IA [FreeTextEntry4] : invasive ductal carcinoma of the right breast, G2, ER+, [place]+, HER2 -, central region [TTNM] : 1b [NTNM] : 0 [de-identified] : right breast  [MTNM] : 0

## 2024-12-24 NOTE — HISTORY OF PRESENT ILLNESS
[FreeTextEntry1] : The patient is a 60-year-old woman who was noted on screening mammogram (9/6/2024 @ [facility]) to have a mass in the right breast at 6 o'clock.  Right Diagnostic mammogram and ultrasound on 10/9/2024 showed in the right breast, an indeterminant hypoechoic mass, 0.5 X 0.4 X 0.5 cm in the retroareolar.  Biopsy of the right breast recommended.  BI-RADS 4: Suspicious   On 10/24/2024 ([facility]), she had a biopsy of the right breast abnormality at the retroareolar region and pathology showed invasive ductal carcinoma, G2.   On 11/11/2024, outside slides were reviewed at [facility] from [facility] and pathology showed right breast retroareolar mass, invasive moderately differentiated ductal carcinoma, with DCIS, ER+, [place]+, HER2 negative.  The Ki-67 15%.  On 11/27/2024, She underwent a right lumpectomy and sentinel node biopsy with Dr. Cruz.  She was found to have a 9.5 mm invasive ductal carcinoma, G2 with DCIS, negative for EIC, ER/[place] positive / HER2 negative.  Surgical margins were negative as well as 0/2 negative sentinel lymph node.  There was no LVSI/PNI.    She established care with Dr. Casas, endocrine therapy with Anastrozole recommended.  Her Oncotype is still pending.    She has been doing well since surgery.  She is here to discuss radiation.

## 2024-12-24 NOTE — LETTER CLOSING
[Consult Closing:] : Thank you for allowing me to participate in the care of this patient.  If you have any questions, please do not hesitate to contact me. [Sincerely yours,] : Sincerely yours, [FreeTextEntry3] : Juliette Min M.D.   Electronically proofread and signed by:  Juliette Min MD Attending, Department of Radiation Medicine [facility]

## 2024-12-24 NOTE — PHYSICAL EXAM
[Sclera] : the sclera and conjunctiva were normal [Hearing Threshold Finger Rub Not [place]] : hearing was normal [Heart Rate And Rhythm] : heart rate and rhythm were normal [Heart Sounds] : normal S1 and S2 [Murmurs] : no murmurs present [Edema] : no peripheral edema present [No Discharge] : no discharge [No Masses] : no breast masses were palpable [Abdomen Soft] : soft [Nondistended] : nondistended [Abdomen Tenderness] : non-tender [Cervical Lymph Nodes Enlarged Posterior Bilaterally] : posterior cervical [Cervical Lymph Nodes Enlarged Anterior Bilaterally] : anterior cervical [Supraclavicular Lymph Nodes Enlarged Bilaterally] : supraclavicular [Nail Clubbing] : no clubbing  or cyanosis of the fingernails [Skin Color & Pigmentation] : normal skin color and pigmentation [No Focal Deficits] : no focal deficits [Motor Exam] : the motor exam was normal [Normal] : no palpable adenopathy [Enlargement Of The Right Breast] : no swelling [Tenderness Of The Right Breast] : no tenderness [___] :  no erythema [Enlargement Of The Left Breast] : no swelling [Tenderness Of The Left Breast] : no tenderness [Axillary Lymph Nodes Enlarged Bilaterally] : no enlarged nodes

## 2025-01-12 NOTE — ASSESSMENT
[FreeTextEntry1] : 59 yo female has stage IA ([id number]) IDC of the right breast, G2, ER/[place] positive and Her-2 negative, s/p right breast lumpectomy and SLNB with negative margins.  Oncotype RS is 27.  Assessment and Plan: -- We discussed adjuvant systemic therapy again. Janet has stage IA IDC of the right breast, ER/NJ positive and Her-2 negative with favorable clicnical features (small size, G2, negative SLN, -LVI). However, her Oncotype RS came back and is in the high risk range which predicts benefit from adjuvant chemotherapy. We discussed chemotherapy regimens including Taxotere and Cytoxan (TC) every 3 weeks for 4 cycles or CMF every 3 weeks for 8 cycles. The side effects from chemo may include but not limit to bone marrow suppression, cytopenia, increased risk of infection, nausea, vomiting, diarrhea, fatigue, alopecia, infusional reaction, peripheral neuropathy. Janet wants to think about and will let me know her decision.  -- Followup with Dr. Min for adjuvant radiotherapy. If she decides not to have chemo, she will proceed with radiation.  -- Regarding adjutant endocrine therapy, we discussed during her previous visit. Anastrozole 1 mg daily for 5 years is recommended. The potential side effects may include but not limit to muscular and skeletal symptoms, arthralgia, bone loss, osteopenia and osteoporosis, a small increased risk of cardiovascular events and slight increase of hyperlipidemia. -- We discussed bone health management. She is recommended to take calcium and vitamin d supplement, and regular physical activities. She will be referred for bone density. We will make additional recommendation based on her bone density.  Addendum: The patient decided not to take chemotherapy. She will proceed with adjuvant RT and will start Anastrozole after she completes with radiation.   RTO for followup in 3 months.

## 2025-01-12 NOTE — PHYSICAL EXAM
[Fully active, able to carry on all pre-disease performance without restriction] : Status 0 - Fully active, able to carry on all pre-disease performance without restriction [Normal] : grossly intact [de-identified] : S/P right breast lumpectomy and right axillary SLNB. Surgical incisions are healing well.

## 2025-01-12 NOTE — ASSESSMENT
[FreeTextEntry1] : 59 yo female has stage IA ([id number]) IDC of the right breast, G2, ER/MO positive and Her-2 negative, s/p right breast lumpectomy and SLNB with negative margins.  Oncotype RS is 27.  Assessment and Plan: -- We discussed adjuvant systemic therapy again. Janet has stage IA IDC of the right breast, ER/MO positive and Her-2 negative with favorable clicnical features (small size, G2, negative SLN, -LVI). However, her Oncotype RS came back and is in the high risk range which predicts benefit from adjuvant chemotherapy. We discussed chemotherapy regimens including Taxotere and Cytoxan (TC) every 3 weeks for 4 cycles or CMF every 3 weeks for 8 cycles. The side effects from chemo may include but not limit to bone marrow suppression, cytopenia, increased risk of infection, nausea, vomiting, diarrhea, fatigue, alopecia, infusional reaction, peripheral neuropathy. Janet wants to think about and will let me know her decision.  -- Followup with Dr. Min for adjuvant radiotherapy. If she decides not to have chemo, she will proceed with radiation.  -- Regarding adjutant endocrine therapy, we discussed during her previous visit. Anastrozole 1 mg daily for 5 years is recommended. The potential side effects may include but not limit to muscular and skeletal symptoms, arthralgia, bone loss, osteopenia and osteoporosis, a small increased risk of cardiovascular events and slight increase of hyperlipidemia. -- We discussed bone health management. She is recommended to take calcium and vitamin d supplement, and regular physical activities. She will be referred for bone density. We will make additional recommendation based on her bone density.  Addendum: The patient decided not to take chemotherapy. She will proceed with adjuvant RT and will start Anastrozole after she completes with radiation.   RTO for followup in 3 months.

## 2025-01-12 NOTE — HISTORY OF PRESENT ILLNESS
[de-identified] : 60-year-old female patient is referred by Dr Cruz for consultation of adjuvant systemic therapy for right breast cancer.  Her work-up is as follows: B/L Screening Mammo - 09/06/2024: -Breast density: There are scattered areas of fibroglandular density. -No suspicious masses, calcifications, or other findings are seen in the left breast. -In the right breast at 6:00 location there is a new mass for which additional imaging is recommended. BI-RADS 0: INCOMPLETE - NEED ADDITIONAL IMAGING EVALUATION  Right Uni Dx Mammo & Sono - 10/09/2024: -In the retroareolar right breast there is an irregular mass which persists on additional imaging. US BREAST LIMITED RIGHT -In the retroareolar right corresponding to the mammographic abnormality there is a hypoechoic irregular mass measuring 0.5 x 0.4 x 0.5 cm. Ultrasound core biopsy is recommended for further evaluation. BI-RADS 4: SUSPICIOUS  US Guided Core Bx - 10/24/2024: Right, retroareolar, 0.5cm: (cork) -Invasive ductal carcinoma, moderately differentiated, ER pos %, WA pos 51-60%, Her-2 neg (1+ by IHC), Ki-67 15%.  On 11/27/2024Breast, she had right retroareolar mass, needle localized lumpectomy: - Prior biopsy site changes with invasive moderately differentiated ductal carcinoma, 9.5 mm (microscopic measurement), with scattered single cell necrosis and focal solid papillary features. - Non-extensive ductal carcinoma in situ (DCIS), cribriform and comedo types, intermediate nuclear grade. - Margins are negative.  - No lymphovascular invasion is identified. - Atrophic fatty breast tissue with proliferative type fibrocystic changes associated with phosphate microcalcifications present in small ductules. - Two lymph nodes (0/2). Negative for carcinoma. - AJCC 8th Edition Pathologic Stage: pT1b, p(sn)N0, pMx.  She recovered well from surgery and is here today to discuss adjuvant systemic therapy.  [de-identified] : 1/7/25: Janet is here today for followup visit. Her  is with her. She has stage IA ([id number]) IDC of the right breast, G2, ER/[place] positive and Her-2 negative, s/p right breast lumpectomy and SLNB on 11/27/24 with negative margins. During her previous visit, we discussed adjuvant endocrine therapy. She was recommended to take Anastrozole. At time, her Oncotype RS was pending. Her RS came back now and it is 27, which predicts benefit from chemotherapy. She is here to have discussion.

## 2025-01-12 NOTE — PHYSICAL EXAM
[Fully active, able to carry on all pre-disease performance without restriction] : Status 0 - Fully active, able to carry on all pre-disease performance without restriction [Normal] : grossly intact [de-identified] : S/P right breast lumpectomy and right axillary SLNB. Surgical incisions are healing well.

## 2025-01-12 NOTE — HISTORY OF PRESENT ILLNESS
[de-identified] : 60-year-old female patient is referred by Dr Cruz for consultation of adjuvant systemic therapy for right breast cancer.  Her work-up is as follows: B/L Screening Mammo - 09/06/2024: -Breast density: There are scattered areas of fibroglandular density. -No suspicious masses, calcifications, or other findings are seen in the left breast. -In the right breast at 6:00 location there is a new mass for which additional imaging is recommended. BI-RADS 0: INCOMPLETE - NEED ADDITIONAL IMAGING EVALUATION  Right Uni Dx Mammo & Sono - 10/09/2024: -In the retroareolar right breast there is an irregular mass which persists on additional imaging. US BREAST LIMITED RIGHT -In the retroareolar right corresponding to the mammographic abnormality there is a hypoechoic irregular mass measuring 0.5 x 0.4 x 0.5 cm. Ultrasound core biopsy is recommended for further evaluation. BI-RADS 4: SUSPICIOUS  US Guided Core Bx - 10/24/2024: Right, retroareolar, 0.5cm: (cork) -Invasive ductal carcinoma, moderately differentiated, ER pos %, ND pos 51-60%, Her-2 neg (1+ by IHC), Ki-67 15%.  On 11/27/2024Breast, she had right retroareolar mass, needle localized lumpectomy: - Prior biopsy site changes with invasive moderately differentiated ductal carcinoma, 9.5 mm (microscopic measurement), with scattered single cell necrosis and focal solid papillary features. - Non-extensive ductal carcinoma in situ (DCIS), cribriform and comedo types, intermediate nuclear grade. - Margins are negative.  - No lymphovascular invasion is identified. - Atrophic fatty breast tissue with proliferative type fibrocystic changes associated with phosphate microcalcifications present in small ductules. - Two lymph nodes (0/2). Negative for carcinoma. - AJCC 8th Edition Pathologic Stage: pT1b, p(sn)N0, pMx.  She recovered well from surgery and is here today to discuss adjuvant systemic therapy.  [de-identified] : 1/7/25: Janet is here today for followup visit. Her  is with her. She has stage IA ([id number]) IDC of the right breast, G2, ER/[place] positive and Her-2 negative, s/p right breast lumpectomy and SLNB on 11/27/24 with negative margins. During her previous visit, we discussed adjuvant endocrine therapy. She was recommended to take Anastrozole. At time, her Oncotype RS was pending. Her RS came back now and it is 27, which predicts benefit from chemotherapy. She is here to have discussion.

## 2025-01-12 NOTE — CONSULT LETTER
[Dear  ___] : Dear  [unfilled], [Consult Letter:] : I had the pleasure of evaluating your patient, [unfilled]. [Please see my note below.] : Please see my note below. [Consult Closing:] : Thank you very much for allowing me to participate in the care of this patient.  If you have any questions, please do not hesitate to contact me. [Sincerely,] : Sincerely, [DrIngrid  ___] : Dr. WILSON [DrIngrid ___] : Dr. WILSON [Courtesy Letter:] : I had the pleasure of seeing your patient, [unfilled], in my office today. [FreeTextEntry3] : Megan Casas MD

## 2025-01-12 NOTE — REASON FOR VISIT
[Follow-Up Visit] : a follow-up [Initial Consultation] : an initial consultation [FreeTextEntry2] : Right breast cancer.

## 2025-01-13 NOTE — DISEASE MANAGEMENT
[Pathological] : TNM Stage: p [FreeTextEntry4] : invasive ductal carcinoma of the right breast, G2, ER+, NC+, HER2 -, central region [TTNM] : 1b [NTNM] : 0 [MTNM] : 0 [IA] : IA [de-identified] : 530cGy [de-identified] : 5240cGy [de-identified] : right breast

## 2025-01-16 NOTE — DISEASE MANAGEMENT
[FreeTextEntry4] : invasive ductal carcinoma of the right breast, G2, ER+, [place]+, HER2 -, central region [TTNM] : 1b [NTNM] : 0 [MTNM] : 0 [de-identified] : 1060cGy [de-identified] : 5240cGy [de-identified] : right breast

## 2025-01-16 NOTE — PHYSICAL EXAM
[Sclera] : the sclera and conjunctiva were normal [] : no respiratory distress [Exaggerated Use Of Accessory Muscles For Inspiration] : no accessory muscle use [Normal] : oriented to person, place and time, the affect was normal, the mood was normal and not anxious [de-identified] : No skin reaction

## 2025-01-21 NOTE — HISTORY OF PRESENT ILLNESS
[FreeTextEntry1] : Patient feeling well, denies breast pain and has no new concerns.  She is applying Aquaphor 3X/day.

## 2025-01-21 NOTE — DISEASE MANAGEMENT
[Pathological] : TNM Stage: p [IA] : IA [FreeTextEntry4] : invasive ductal carcinoma of the right breast, G2, ER+, ID+, HER2 -, central region [TTNM] : 1b [NTNM] : 0 [MTNM] : 0 [de-identified] : 8226qGy [de-identified] : 5240cGy [de-identified] : right breast

## 2025-01-21 NOTE — PHYSICAL EXAM
[Sclera] : the sclera and conjunctiva were normal [Hearing Threshold Finger Rub Not [place]] : hearing was normal [] : no respiratory distress [Respiration, Rhythm And Depth] : normal respiratory rhythm and effort [Exaggerated Use Of Accessory Muscles For Inspiration] : no accessory muscle use [___] : a [unfilled] ~Ucm mastectomy scar [Nail Clubbing] : no clubbing  or cyanosis of the fingernails [No Focal Deficits] : no focal deficits [Normal] : oriented to person, place and time, the affect was normal, the mood was normal and not anxious

## 2025-01-21 NOTE — DISEASE MANAGEMENT
[Pathological] : TNM Stage: p [IA] : IA [FreeTextEntry4] : invasive ductal carcinoma of the right breast, G2, ER+, [place]+, HER2 -, central region [TTNM] : 1b [NTNM] : 0 [MTNM] : 0 [de-identified] : 3603eGy [de-identified] : 5240cGy [de-identified] : right breast

## 2025-01-21 NOTE — PHYSICAL EXAM
[Sclera] : the sclera and conjunctiva were normal [Hearing Threshold Finger Rub Not [place]] : hearing was normal [] : no respiratory distress [Respiration, Rhythm And Depth] : normal respiratory rhythm and effort [Exaggerated Use Of Accessory Muscles For Inspiration] : no accessory muscle use [___] : a [unfilled] ~Ucm mastectomy scar [Nail Clubbing] : no clubbing  or cyanosis of the fingernails [No Focal Deficits] : no focal deficits [Normal] : oriented to person, place and time, the affect was normal, the mood was normal and not anxious

## 2025-01-27 NOTE — PHYSICAL EXAM
[Sclera] : the sclera and conjunctiva were normal [Hearing Threshold Finger Rub Not [place]] : hearing was normal [] : no respiratory distress [Respiration, Rhythm And Depth] : normal respiratory rhythm and effort [Exaggerated Use Of Accessory Muscles For Inspiration] : no accessory muscle use [___] : a [unfilled] ~Ucm mastectomy scar [Nail Clubbing] : no clubbing  or cyanosis of the fingernails [No Focal Deficits] : no focal deficits [Normal] : oriented to person, place and time, the affect was normal, the mood was normal and not anxious

## 2025-01-27 NOTE — HISTORY OF PRESENT ILLNESS
[FreeTextEntry1] : Patient feels well, mild fatigue, mild- mod erythema to the treated site.  Applying Aquaphor 3X daily, no breast pain, no pruritus and no new concerns.

## 2025-01-27 NOTE — DISEASE MANAGEMENT
[Pathological] : TNM Stage: p [IA] : IA [FreeTextEntry4] : invasive ductal carcinoma of the right breast, G2, ER+, [place]+, HER2 -, central region [TTNM] : 1b [NTNM] : 0 [MTNM] : 0 [de-identified] : [id number] [de-identified] : right breast  [de-identified] : 5240cGy

## 2025-01-31 NOTE — DISEASE MANAGEMENT
[Pathological] : TNM Stage: p [FreeTextEntry4] : invasive ductal carcinoma of the right breast, G2, ER+, [place]+, HER2 -, central region [TTNM] : 1b [NTNM] : 0 [MTNM] : 0 [IA] : IA [de-identified] : 1227mNp [de-identified] : 5240cGy [de-identified] : right breast

## 2025-01-31 NOTE — DISEASE MANAGEMENT
[Pathological] : TNM Stage: p [FreeTextEntry4] : invasive ductal carcinoma of the right breast, G2, ER+, [place]+, HER2 -, central region [TTNM] : 1b [NTNM] : 0 [MTNM] : 0 [IA] : IA [de-identified] : [id number] [de-identified] : 5240cGy [de-identified] : right breast

## 2025-03-12 NOTE — PHYSICAL EXAM
[Sclera] : the sclera and conjunctiva were normal [Hearing Threshold Finger Rub Not [place]] : hearing was normal [Heart Rate And Rhythm] : heart rate and rhythm were normal [Heart Sounds] : normal S1 and S2 [Murmurs] : no murmurs present [Edema] : no peripheral edema present [No Discharge] : no discharge [No Masses] : no breast masses were palpable [Abdomen Soft] : soft [Nondistended] : nondistended [Abdomen Tenderness] : non-tender [Cervical Lymph Nodes Enlarged Posterior Bilaterally] : posterior cervical [Cervical Lymph Nodes Enlarged Anterior Bilaterally] : anterior cervical [Supraclavicular Lymph Nodes Enlarged Bilaterally] : supraclavicular [Nail Clubbing] : no clubbing  or cyanosis of the fingernails [Motor Tone] : muscle strength and tone were normal [No Focal Deficits] : no focal deficits [Motor Exam] : the motor exam was normal [Normal] : oriented to person, place and time, the affect was normal, the mood was normal and not anxious [Enlargement Of The Right Breast] : no swelling [Tenderness Of The Right Breast] : no tenderness [___] :  no erythema [Enlargement Of The Left Breast] : no swelling [Tenderness Of The Left Breast] : no tenderness [Axillary Lymph Nodes Enlarged Bilaterally] : no enlarged nodes [de-identified] : mild-mod residual hyperpigmentation

## 2025-03-12 NOTE — DISEASE MANAGEMENT
[Pathological] : TNM Stage: p [IA] : IA [FreeTextEntry4] : invasive ductal carcinoma of the right breast, G2, ER+, [place]+, HER2 -, central region [TTNM] : 1b [NTNM] : 0 [MTNM] : 0 [de-identified] : 5240cGy [de-identified] : 5240cGy [de-identified] : right breast

## 2025-03-12 NOTE — PHYSICAL EXAM
[Fully active, able to carry on all pre-disease performance without restriction] : Status 0 - Fully active, able to carry on all pre-disease performance without restriction [Normal] : grossly intact [de-identified] : S/P right breast lumpectomy and right axillary SLNB. Surgical incisions are healing well.

## 2025-03-12 NOTE — ASSESSMENT
[FreeTextEntry1] : 61 yo female has stage IA ([id number]) IDC of the right breast, G2, ER/NM positive and Her-2 negative, s/p right breast lumpectomy and SLNB with negative margins.  Oncotype RS is 27.  Assessment and Plan: -- Continue Anastrozole daily. AI related side effects reviewed. -- Breast exam today shows post radiation change with increased skin pigmentation. She is scheduled for repeat dx mammo and US.  -- Bone density study was WNL. Encourage regular exercise, calcium and vitamin D supplement.  -- S/P adjuvant RT. Followup up with Dr. Min.  -- Followup with Dr. Cruz as directed.  RTO for followup in 6 months.

## 2025-03-12 NOTE — REVIEW OF SYSTEMS
[Negative] : Psychiatric [Fever] : no fever [Chills] : no chills [Fatigue] : no fatigue [Chest Pain] : no chest pain [Palpitations] : no palpitations [Shortness Of Breath] : no shortness of breath [Wheezing] : no wheezing [Cough] : no cough [Joint Pain] : no joint pain [Muscle Pain] : no muscle pain [Hot Flashes] : no hot flashes

## 2025-03-12 NOTE — HISTORY OF PRESENT ILLNESS
[FreeTextEntry1] :  LUZ PANDYA returns to clinic in follow up visit.  As you know, LUZ PANDYA is a 61-year-old female with invasive ductal carcinoma of the right breast, G2, ER+, [place]+, HER2 -, central region. TNM Stage: p T 1b N 0 M 0, AJCC Stage (8th Ed): IA. She is s/p breast conserving surgery.  The patient was treated to the right breast using a 3D Conformal (3D CRT) technique.  The patient tolerated treatments quite well. The patient had the expected side effects of skin erythema and fatigue.  The patient received 5240 cGy to the right breast from 1/13/2025 through 2/10/2025.  I last saw her in Feb 2025.    In the interim, the patient has done well.  She denies breast pain and has no breast concerns. She is tolerating Anastrozole well.   She continues with skin care, has mild to mod residual hyperpigmentation to the treated region.   She participates in support groups via social media.  Her upcoming right Dx Mammo/US 5/13/2025.  No new issues.    Upcoming appointments:  Dr. Casas 3/12/2025 Dr. Cruz 5/22/2025 GYN 5/27/2025

## 2025-03-12 NOTE — CONSULT LETTER
[Dear  ___] : Dear  [unfilled], [Courtesy Letter:] : I had the pleasure of seeing your patient, [unfilled], in my office today. [Please see my note below.] : Please see my note below. [Sincerely,] : Sincerely, [DrIngrid  ___] : Dr. WILSON [DrIngrid ___] : Dr. WILSON [FreeTextEntry3] : Megan Casas MD

## 2025-05-22 NOTE — REASON FOR VISIT
Female [Follow-Up: _____] : a [unfilled] follow-up visit [FreeTextEntry1] : right breast cancer s/p lumpectomy and SLNB on 11/27/24: 9.5mm IDC. 0/2 nodes.pT1b, p(sn)N0, pMx.

## 2025-05-22 NOTE — DATA REVIEWED
[FreeTextEntry1] : Right Uni Dx Mammo & Sono - 05/13/2025: MAMMOGRAM:    Tomosynthesis 3D and 2D imaging of the breast(s) were performed.  Current study was also evaluated with a computer aided detection (CAD) system.   Breast Density: There are scattered areas of fibroglandular density.   No suspicious masses, calcifications, or other findings are seen. Postlumpectomy changes are seen in the right breast   US BREAST COMPLETE RIGHT   Ultrasound evaluation was performed including examination of all four quadrants of the breast(s) and the retroareolar regions.     Color flow, Gray scale and real-time ultrasound of the right breast was performed.    No suspicious abnormalities were seen sonographically. OVERALL IMPRESSION:    No imaging evidence of malignancy on the current exam(s). Patient is due for a bilateral diagnostic mammogram in September 2025   A 4 month follow-up of both breast(s) is recommended.   The patient will be sent a normal letter.   BI-RADS 2: BENIGN

## 2025-05-22 NOTE — PHYSICAL EXAM
[Normocephalic] : normocephalic [EOMI] : extra ocular movement intact [No Supraclavicular Adenopathy] : no supraclavicular adenopathy [No Cervical Adenopathy] : no cervical adenopathy [Examined in the supine and seated position] : examined in the supine and seated position [No dominant masses] : no dominant masses in right breast  [No dominant masses] : no dominant masses left breast [No Nipple Retraction] : no left nipple retraction [No Nipple Discharge] : no left nipple discharge [No Axillary Lymphadenopathy] : no left axillary lymphadenopathy [No Rashes] : no rashes [No Ulceration] : no ulceration [de-identified] : NL respirations

## 2025-05-22 NOTE — PHYSICAL EXAM
[Normocephalic] : normocephalic [EOMI] : extra ocular movement intact [No Supraclavicular Adenopathy] : no supraclavicular adenopathy [No Cervical Adenopathy] : no cervical adenopathy [Examined in the supine and seated position] : examined in the supine and seated position [No dominant masses] : no dominant masses in right breast  [No dominant masses] : no dominant masses left breast [No Nipple Retraction] : no left nipple retraction [No Nipple Discharge] : no left nipple discharge [No Axillary Lymphadenopathy] : no left axillary lymphadenopathy [No Rashes] : no rashes [No Ulceration] : no ulceration [de-identified] : NL respirations

## 2025-05-22 NOTE — ASSESSMENT
[FreeTextEntry1] : LUZ PANDYA is a 60-year-old female patient with right breast cancer s/p lumpectomy and SLNB on 11/27/24: 9.5mm IDC. 0/2 nodes.pT1b, p(sn)N0, pMx. Dr. Megan Casas - Anastrozole. Dr. Juliette Min - The patient received 5240 cGy to the right breast from 1/13/2025 through 2/10/2025.   Her work-up is as follows: B/L Screening Mammo - 09/06/2024: -Breast density: There are scattered areas of fibroglandular density. -No suspicious masses, calcifications, or other findings are seen in the left breast.  -In the right breast at 6:00 location there is a new mass for which additional imaging is recommended. BI-RADS 0: INCOMPLETE - NEED ADDITIONAL IMAGING EVALUATION  Right Uni Dx Mammo & Sono - 10/09/2024: -In the retroareolar right breast there is an irregular mass which persists on additional imaging.  US BREAST LIMITED RIGHT -In the retroareolar right corresponding to the mammographic abnormality there is a hypoechoic irregular mass measuring 0.5 x 0.4 x 0.5 cm.  Ultrasound core biopsy is recommended for further evaluation. BI-RADS 4: SUSPICIOUS  US Guided Core Bx - 10/24/2024: Right, retroareolar, 0.5cm: (cork) -Invasive ductal carcinoma, moderately differentiated. The pathology results are concordant with the imaging findings. ER  (+) [place]  (+) HER2  (-) Ki-67  15%  11/27/2024Breast, right retroareolar mass, needle localized lumpectomy: - Prior biopsy site changes with invasive moderately differentiated ductal carcinoma, 9.5 mm (microscopic measurement), with scattered single cell necrosis and focal solid papillary features. - Non-extensive ductal carcinoma in situ (DCIS), cribriform and comedo types, intermediate nuclear grade. - No lymphovascular invasion is identified. - Atrophic fatty breast tissue with proliferative type fibrocystic changes associated with phosphate microcalcifications present in small ductules. Breast, right axillary sentinel lymph node #1, excision: - Two lymph nodes (0/2).  Negative for carcinoma with evaluation of multiple H T E levels and with negative immunohistochemical stains for cytokeratins (CK AE1/AE3 and CK 8/18). pT1b, p(sn)N0, pMx.  Dr. Megan Casas - Anastrozole. Dr. Juliette Min - The patient received 5240 cGy to the right breast from 1/13/2025 through 2/10/2025.   Right Uni Dx Mammo & Sono - 05/13/2025: -Breast Density: There are scattered areas of fibroglandular density. -No suspicious masses, calcifications, or other findings are seen.  -Postlumpectomy changes are seen in the right breast US BREAST COMPLETE RIGHT  -No suspicious abnormalities were seen sonographically. OVERALL IMPRESSION:  No imaging evidence of malignancy on the current exam(s).  Patient is due for a bilateral diagnostic mammogram in September 2025  We discussed her most recent imaging in detail. We discussed it was benign.  She last saw medical oncology in 3/2025 with a 6 month follow up recommended and she saw radiation oncology in 3/2025 with a 6 month follow up recommended.  We discussed breast density. Increasing breast density has been found to increase ones risk of breast cancer, but at this time, there is no clear indication for additional imaging in this setting, as both US and MRI have not been found to improve survival. One can consider bilateral screening US. However, out of 1000 women screened, the use of routine US will only identify an additional 3-5 cancers. The use of US was found to increase the likelihood of undergoing more imaging and more biopsies. She does not have dense breasts.   Bio-impedance testing.  All questions and concerns were answered in detail.  Patient is for bilateral mmg in 9/2025. She is for follow up after imaging, pending any interval changes. She is for follow up with medical oncology and radiation oncology as scheduled. Mariajose.  Total time spent on encounter was greater than 40 minutes , which included face to face time with the patient, performing an exam, reviewing previous medical records, reviewing current imaging/ pathology, documenting in patient record and coordinating care/imaging. Greater than 50% of the encounter was spent on counseling and coordination of her breast issue.

## 2025-05-22 NOTE — HISTORY OF PRESENT ILLNESS
[FreeTextEntry1] : LUZ PANDYA is a 60-year-old female patient with right breast cancer s/p lumpectomy and SLNB on 11/27/24: 9.5mm IDC. 0/2 nodes.pT1b, p(sn)N0, pMx. Dr. Megan Casas - Anastrozole. Dr. Juliette Min - The patient received 5240 cGy to the right breast from 1/13/2025 through 2/10/2025.  FHx: Denies  Her work-up is as follows: B/L Screening Mammo - 09/06/2024: -Breast density: There are scattered areas of fibroglandular density. -No suspicious masses, calcifications, or other findings are seen in the left breast.  -In the right breast at 6:00 location there is a new mass for which additional imaging is recommended. BI-RADS 0: INCOMPLETE - NEED ADDITIONAL IMAGING EVALUATION  Right Uni Dx Mammo & Sono - 10/09/2024: -In the retroareolar right breast there is an irregular mass which persists on additional imaging.  US BREAST LIMITED RIGHT -In the retroareolar right corresponding to the mammographic abnormality there is a hypoechoic irregular mass measuring 0.5 x 0.4 x 0.5 cm.  Ultrasound core biopsy is recommended for further evaluation. BI-RADS 4: SUSPICIOUS  US Guided Core Bx - 10/24/2024: Right, retroareolar, 0.5cm: (cork) -Invasive ductal carcinoma, moderately differentiated. The pathology results are concordant with the imaging findings. ER  (+) [place]  (+) HER2  (-) Ki-67  15%  11/27/2024Breast, right retroareolar mass, needle localized lumpectomy: - Prior biopsy site changes with invasive moderately differentiated ductal carcinoma, 9.5 mm (microscopic measurement), with scattered single cell necrosis and focal solid papillary features. - Non-extensive ductal carcinoma in situ (DCIS), cribriform and comedo types, intermediate nuclear grade. - No lymphovascular invasion is identified. - Atrophic fatty breast tissue with proliferative type fibrocystic changes associated with phosphate microcalcifications present in small ductules. Breast, right axillary sentinel lymph node #1, excision: - Two lymph nodes (0/2).  Negative for carcinoma with evaluation of multiple H T E levels and with negative immunohistochemical stains for cytokeratins (CK AE1/AE3 and CK 8/18). pT1b, p(sn)N0, pMx.  Denies any acute complaints. Healing well.    Dr. Megan Casas - Anastrozole. Dr. Juliette Min - The patient received 5240 cGy to the right breast from 1/13/2025 through 2/10/2025.  Most recent imaging: Right Uni Dx Mammo & Sono - 05/13/2025: -Breast Density: There are scattered areas of fibroglandular density. -No suspicious masses, calcifications, or other findings are seen.  -Postlumpectomy changes are seen in the right breast US BREAST COMPLETE RIGHT  -No suspicious abnormalities were seen sonographically. OVERALL IMPRESSION:  No imaging evidence of malignancy on the current exam(s).  Patient is due for a bilateral diagnostic mammogram in September 2025 BI-RADS 2: BENIGN  Denies any current complaints. No acute changes to her breasts.

## 2025-05-22 NOTE — REASON FOR VISIT
[Follow-Up: _____] : a [unfilled] follow-up visit [FreeTextEntry1] : right breast cancer s/p lumpectomy and SLNB on 11/27/24: 9.5mm IDC. 0/2 nodes.pT1b, p(sn)N0, pMx.

## 2025-05-22 NOTE — ASSESSMENT
[FreeTextEntry1] : LUZ PANDYA is a 60-year-old female patient with right breast cancer s/p lumpectomy and SLNB on 11/27/24: 9.5mm IDC. 0/2 nodes.pT1b, p(sn)N0, pMx. Dr. Megan Casas - Anastrozole. Dr. Juliette Min - The patient received 5240 cGy to the right breast from 1/13/2025 through 2/10/2025.   Her work-up is as follows: B/L Screening Mammo - 09/06/2024: -Breast density: There are scattered areas of fibroglandular density. -No suspicious masses, calcifications, or other findings are seen in the left breast.  -In the right breast at 6:00 location there is a new mass for which additional imaging is recommended. BI-RADS 0: INCOMPLETE - NEED ADDITIONAL IMAGING EVALUATION  Right Uni Dx Mammo & Sono - 10/09/2024: -In the retroareolar right breast there is an irregular mass which persists on additional imaging.  US BREAST LIMITED RIGHT -In the retroareolar right corresponding to the mammographic abnormality there is a hypoechoic irregular mass measuring 0.5 x 0.4 x 0.5 cm.  Ultrasound core biopsy is recommended for further evaluation. BI-RADS 4: SUSPICIOUS  US Guided Core Bx - 10/24/2024: Right, retroareolar, 0.5cm: (cork) -Invasive ductal carcinoma, moderately differentiated. The pathology results are concordant with the imaging findings. ER  (+) [place]  (+) HER2  (-) Ki-67  15%  11/27/2024Breast, right retroareolar mass, needle localized lumpectomy: - Prior biopsy site changes with invasive moderately differentiated ductal carcinoma, 9.5 mm (microscopic measurement), with scattered single cell necrosis and focal solid papillary features. - Non-extensive ductal carcinoma in situ (DCIS), cribriform and comedo types, intermediate nuclear grade. - No lymphovascular invasion is identified. - Atrophic fatty breast tissue with proliferative type fibrocystic changes associated with phosphate microcalcifications present in small ductules. Breast, right axillary sentinel lymph node #1, excision: - Two lymph nodes (0/2).  Negative for carcinoma with evaluation of multiple H T E levels and with negative immunohistochemical stains for cytokeratins (CK AE1/AE3 and CK 8/18). pT1b, p(sn)N0, pMx.  Dr. Megan Casas - Anastrozole. Dr. Juliette Min - The patient received 5240 cGy to the right breast from 1/13/2025 through 2/10/2025.   Right Uni Dx Mammo & Sono - 05/13/2025: -Breast Density: There are scattered areas of fibroglandular density. -No suspicious masses, calcifications, or other findings are seen.  -Postlumpectomy changes are seen in the right breast US BREAST COMPLETE RIGHT  -No suspicious abnormalities were seen sonographically. OVERALL IMPRESSION:  No imaging evidence of malignancy on the current exam(s).  Patient is due for a bilateral diagnostic mammogram in September 2025  We discussed her most recent imaging in detail. We discussed it was benign.  She last saw medical oncology in 3/2025 with a 6 month follow up recommended and she saw radiation oncology in 3/2025 with a 6 month follow up recommended.  We discussed breast density. Increasing breast density has been found to increase ones risk of breast cancer, but at this time, there is no clear indication for additional imaging in this setting, as both US and MRI have not been found to improve survival. One can consider bilateral screening US. However, out of 1000 women screened, the use of routine US will only identify an additional 3-5 cancers. The use of US was found to increase the likelihood of undergoing more imaging and more biopsies. She does not have dense breasts.   Bio-impedance testing.  All questions and concerns were answered in detail.  Patient is for bilateral mmg in 9/2025. She is for follow up after imaging, pending any interval changes. She is for follow up with medical oncology and radiation oncology as scheduled. Mariajose.  Total time spent on encounter was greater than 40 minutes , which included face to face time with the patient, performing an exam, reviewing previous medical records, reviewing current imaging/ pathology, documenting in patient record and coordinating care/imaging. Greater than 50% of the encounter was spent on counseling and coordination of her breast issue.

## 2025-05-22 NOTE — HISTORY OF PRESENT ILLNESS
[FreeTextEntry1] : LUZ PANDYA is a 60-year-old female patient with right breast cancer s/p lumpectomy and SLNB on 11/27/24: 9.5mm IDC. 0/2 nodes.pT1b, p(sn)N0, pMx. Dr. Megan Casas - Anastrozole. Dr. Juliette Min - The patient received 5240 cGy to the right breast from 1/13/2025 through 2/10/2025.  FHx: Denies  Her work-up is as follows: B/L Screening Mammo - 09/06/2024: -Breast density: There are scattered areas of fibroglandular density. -No suspicious masses, calcifications, or other findings are seen in the left breast.  -In the right breast at 6:00 location there is a new mass for which additional imaging is recommended. BI-RADS 0: INCOMPLETE - NEED ADDITIONAL IMAGING EVALUATION  Right Uni Dx Mammo & Sono - 10/09/2024: -In the retroareolar right breast there is an irregular mass which persists on additional imaging.  US BREAST LIMITED RIGHT -In the retroareolar right corresponding to the mammographic abnormality there is a hypoechoic irregular mass measuring 0.5 x 0.4 x 0.5 cm.  Ultrasound core biopsy is recommended for further evaluation. BI-RADS 4: SUSPICIOUS  US Guided Core Bx - 10/24/2024: Right, retroareolar, 0.5cm: (cork) -Invasive ductal carcinoma, moderately differentiated. The pathology results are concordant with the imaging findings. ER  (+) [place]  (+) HER2  (-) Ki-67  15%  11/27/2024Breast, right retroareolar mass, needle localized lumpectomy: - Prior biopsy site changes with invasive moderately differentiated ductal carcinoma, 9.5 mm (microscopic measurement), with scattered single cell necrosis and focal solid papillary features. - Non-extensive ductal carcinoma in situ (DCIS), cribriform and comedo types, intermediate nuclear grade. - No lymphovascular invasion is identified. - Atrophic fatty breast tissue with proliferative type fibrocystic changes associated with phosphate microcalcifications present in small ductules. Breast, right axillary sentinel lymph node #1, excision: - Two lymph nodes (0/2).  Negative for carcinoma with evaluation of multiple H T E levels and with negative immunohistochemical stains for cytokeratins (CK AE1/AE3 and CK 8/18). pT1b, p(sn)N0, pMx.  Denies any acute complaints. Healing well.    Dr. Megan Casas - Anastrozole. Dr. Juliette Min - The patient received 5240 cGy to the right breast from 1/13/2025 through 2/10/2025.  Most recent imaging: Right Uni Dx Mammo & Sono - 05/13/2025: -Breast Density: There are scattered areas of fibroglandular density. -No suspicious masses, calcifications, or other findings are seen.  -Postlumpectomy changes are seen in the right breast US BREAST COMPLETE RIGHT  -No suspicious abnormalities were seen sonographically. OVERALL IMPRESSION:  No imaging evidence of malignancy on the current exam(s).  Patient is due for a bilateral diagnostic mammogram in September 2025 BI-RADS 2: BENIGN  Denies any current complaints. No acute changes to her breasts.

## 2025-05-27 NOTE — HISTORY OF PRESENT ILLNESS
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 She has a history of right breast invasive ductal carcinoma, status post lumpectomy, status post radiation therapy and started on anastrozole end of February 2025

## 2025-05-27 NOTE — PHYSICAL EXAM
[MA] : MA [FreeTextEntry2] : Tammie [Appropriately responsive] : appropriately responsive [Alert] : alert [No Acute Distress] : no acute distress [No Lymphadenopathy] : no lymphadenopathy [Regular Rate Rhythm] : regular rate rhythm [No Murmurs] : no murmurs [Clear to Auscultation B/L] : clear to auscultation bilaterally [Soft] : soft [Non-tender] : non-tender [Non-distended] : non-distended [No HSM] : No HSM [No Lesions] : no lesions [No Mass] : no mass [Oriented x3] : oriented x3 [Labia Majora] : normal [Labia Minora] : normal [Normal] : normal [Uterine Adnexae] : normal

## 2025-05-27 NOTE — DISCUSSION/SUMMARY
[FreeTextEntry1] : Pap done Self breast exam stressed Follow-up breast screening as directed Prescribed pelvic ultrasound Follow-up yearly or as needed